# Patient Record
(demographics unavailable — no encounter records)

---

## 2024-11-20 NOTE — ASSESSMENT
[FreeTextEntry1] : Bacterial PNA - sxms improving, no fever, cp, wheezing  - seen on CT - c/w abx - RTC 1 mo eval and repeat CT scan   Left lumbar pain w/ sciatica - cyclobenzaprine, ibuprofen prn - lumbar xray - PMR referral

## 2024-11-20 NOTE — HISTORY OF PRESENT ILLNESS
[FreeTextEntry8] : 36F present post ED visit with bacterial pna and lumbar pain. Pt reports going to [facility] ED for fever and sob. In ED CTA r/o PE found evidence of PNA dc on CAP abx. During this time pt reports left lumbar pain with sciatica. Experienced sxms in past that resolved without injections or surgical interventions. currently no fever, cp, sob, wheezing, urinary incontinence, changes in gait. Ketoconazole Counseling:   Patient counseled regarding improving absorption with orange juice.  Adverse effects include but are not limited to breast enlargement, headache, diarrhea, nausea, upset stomach, liver function test abnormalities, taste disturbance, and stomach pain.  There is a rare possibility of liver failure that can occur when taking ketoconazole. The patient understands that monitoring of LFTs may be required, especially at baseline. The patient verbalized understanding of the proper use and possible adverse effects of ketoconazole.  All of the patient's questions and concerns were addressed.
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
## 2024-11-26 NOTE — PHYSICAL EXAM
[de-identified] : Constitutional: Well-nourished, well-developed, No acute distress  LUMBAR SPINE Inspection reveals no scoliosis Range of motion testing demonstrates relief with forward flexion, full AROM  Facet loading maneuver positive on the Left  + positive tenderness to palpation of lumbar paraspinal muscles and midline L4-L5 Seated slump test : negative Straight leg raise : positive  HIPS/PELVIS - Symmetric in standing and lying Hip maneuvers: GORGE positive bilaterally for back pain  Sacroiliac maneuvers: no TTP of bilateral PSIS Non tender buttocks, greater trochanters  NEURO - Normal bulk and tone LE strength 5/5 including hip flexion, knee flexion, knee extension, ankle dorsiflexion, ankle plantarflexion, eversion, and EHL Sensation - intact to light touch in bilateral lower extremities. LE Reflexes 2+ patellar and Achilles reflexes bilaterally no Clonus GAIT - Normal base, normal stride length, non-antalgic  [de-identified] : XR of lumbar spine Date: 11/19/2024   Views: 2 views Performed at [facility]: Yes Impression:   Levoscoliosis of the thoracolumbar spine. Straightening of the lumbar lordosis. No spondylolisthesis. Vertebral body heights and disc heights are preserved. Lower lumbar facet arthrosis.  These images were personally reviewed with original findings documented as above.
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
## 2024-11-26 NOTE — PHYSICAL EXAM
[de-identified] : Constitutional: Well-nourished, well-developed, No acute distress  LUMBAR SPINE Inspection reveals no scoliosis Range of motion testing demonstrates relief with forward flexion, full AROM  Facet loading maneuver positive on the Left  + positive tenderness to palpation of lumbar paraspinal muscles and midline L4-L5 Seated slump test : negative Straight leg raise : positive  HIPS/PELVIS - Symmetric in standing and lying Hip maneuvers: GORGE positive bilaterally for back pain  Sacroiliac maneuvers: no TTP of bilateral PSIS Non tender buttocks, greater trochanters  NEURO - Normal bulk and tone LE strength 5/5 including hip flexion, knee flexion, knee extension, ankle dorsiflexion, ankle plantarflexion, eversion, and EHL Sensation - intact to light touch in bilateral lower extremities. LE Reflexes 2+ patellar and Achilles reflexes bilaterally no Clonus GAIT - Normal base, normal stride length, non-antalgic  [de-identified] : XR of lumbar spine Date: 11/19/2024   Views: 2 views Performed at [facility]: Yes Impression:   Levoscoliosis of the thoracolumbar spine. Straightening of the lumbar lordosis. No spondylolisthesis. Vertebral body heights and disc heights are preserved. Lower lumbar facet arthrosis.  These images were personally reviewed with original findings documented as above.
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
## 2024-12-06 NOTE — ASSESSMENT
[FreeTextEntry1] : Patient is a 37 y/o F who came here for follow-up on her pneumonia.  1) CAP - seen at [facility] ED 11/18/2024 - CT Chest showed:  Patchy groundglass and consolidative opacities along with tree-in-bud  opacities noted in the right lower lobe suggesting infectious versus  inflammatory airspace and small airways disease. Attention on follow-up  after treatment is recommended to assure resolution.  Bronchial wall thickening, most notable in the right lower lobe,  suggesting infectious versus inflammatory bronchitis.  - completed course of antibiotics (Amoxicillin and Azithromycin) - still complains of cough, pleuritic chest pain and shortness of breath - benzonatate just made her cough worse - advised that she does not need additional antibiotics and her symptoms would take time to improve - can continue mucinex as needed - she is scheduled for a repeat CT Chest (12/17/2024)   2) Chest Pain - complains of intermittent mid-chest pain, shortness of breath, dyspnea on exertion - EKG done showed NSR, no ischemic changes - reassured that it is just pleuritic and not anginal in origin - can take NSAIDs as needed  3) Shortness of Breath - reports dyspnea on exertion - trial of albuterol inhaler - would refer to Pulmo if no improvement for PFTs  Total time spent caring for the patient today was 30 minutes. This includes time spent before the visit reviewing the chart, time spent during visit, and time spent after the visit on documentation, etc.

## 2024-12-06 NOTE — PHYSICAL EXAM
[No Acute Distress] : no acute distress [Well Nourished] : well nourished [Well Developed] : well developed [Well-Appearing] : well-appearing [Normal Sclera/Conjunctiva] : normal sclera/conjunctiva [PERRL] : pupils equal round and reactive to light [EOMI] : extraocular movements intact [Normal Outer Ear/Nose] : the outer ears and nose were normal in appearance [Normal Oropharynx] : the oropharynx was normal [No JVD] : no jugular venous distention [No Lymphadenopathy] : no lymphadenopathy [Supple] : supple [Thyroid Normal, No Nodules] : the thyroid was normal and there were no nodules present [No Respiratory Distress] : no respiratory distress  [No Accessory Muscle Use] : no accessory muscle use [Clear to Auscultation] : lungs were clear to auscultation bilaterally [Normal Rate] : normal rate  [Regular Rhythm] : with a regular rhythm [Normal S1, S2] : normal S1 and S2 [No Murmur] : no murmur heard [No Carotid Bruits] : no carotid bruits [No Abdominal Bruit] : a ~M bruit was not heard ~T in the abdomen [No Varicosities] : no varicosities [Pedal Pulses Present] : the pedal pulses are present [No Edema] : there was no peripheral edema [No Palpable Aorta] : no palpable aorta [No Extremity Clubbing/Cyanosis] : no extremity clubbing/cyanosis [Soft] : abdomen soft [Non Tender] : non-tender [Non-distended] : non-distended [No Masses] : no abdominal mass palpated [No HSM] : no HSM [Normal Bowel Sounds] : normal bowel sounds [Normal Posterior Cervical Nodes] : no posterior cervical lymphadenopathy [Normal Anterior Cervical Nodes] : no anterior cervical lymphadenopathy [No CVA Tenderness] : no CVA  tenderness [No Spinal Tenderness] : no spinal tenderness [No Joint Swelling] : no joint swelling [Grossly Normal Strength/Tone] : grossly normal strength/tone [No Rash] : no rash [Coordination Grossly Intact] : coordination grossly intact [No Focal Deficits] : no focal deficits [Normal Gait] : normal gait [Deep Tendon Reflexes (DTR)] : deep tendon reflexes were 2+ and symmetric [Normal Affect] : the affect was normal [Normal Insight/Judgement] : insight and judgment were intact [de-identified] : (+) exudate on L pharyngeal wall

## 2024-12-06 NOTE — HISTORY OF PRESENT ILLNESS
[FreeTextEntry1] : follow-up [de-identified] : Patient is a 37 y/o F who came here for follow-up on her pneumonia. She states that her cough is improving but her breathing is still short. She also reports dyspnea on exertion. She denies any headache, dizziness, nausea, vomiting, cough, fever, chest pain, shortness of breath, palpitation, heartburn, abdominal pain, diarrhea, constipation, dysuria, hematuria, back pain, joint pain, weight loss, loss of appetite.

## 2024-12-09 NOTE — ASSESSMENT
[FreeTextEntry1] : Patient is a 37 y/o F who came here for follow-up on her pneumonia.  1) Pneumonia - completed course of ABx - still symptomatic w/ cough, shortness of breath - continue Amoxicillin for 7 more days  2) Cough - Centor Criteria - 1 (exudates) - send for throat culture, Strep test - continue Amoxicillin for 7 more days  2) Shortness of Breath - work MD concern for asthma - pt. states she has shortness of breath, dyspnea on exertion - symptoms not getting better w/ current  ABx - agreed to have pt referred to Pulmo for PFTs - continue albuterol prn - start symbicort BID - trial of prednisone 20 mg x 5 days  3) Acute Otitis Externa, AS - prescribed Neomycin-Polymyxin otic drops  4) Depression - PHQ-9 - positive - pt attributes to current illness - offered SW consult (refused)  Total time spent caring for the patient today was 30 minutes. This includes time spent before the visit reviewing the chart, time spent during visit, and time spent after the visit on documentation, etc.

## 2024-12-09 NOTE — HISTORY OF PRESENT ILLNESS
[FreeTextEntry1] : follow-up [de-identified] : Patient is a 35 y/o F who came here for follow-up on her pneumonia. She still has cough which is not improving. She also reports dyspnea on exertion. Albuterol did not afford any relief. She also reports L ear pain. She denies any headache, dizziness, nausea, vomiting, cough, fever, chest pain, palpitation, heartburn, abdominal pain, diarrhea, constipation, dysuria, hematuria, back pain, joint pain, weight loss, loss of appetite.

## 2024-12-09 NOTE — PHYSICAL EXAM
[No Acute Distress] : no acute distress [Well Nourished] : well nourished [Well Developed] : well developed [Well-Appearing] : well-appearing [Normal Sclera/Conjunctiva] : normal sclera/conjunctiva [PERRL] : pupils equal round and reactive to light [EOMI] : extraocular movements intact [Normal Oropharynx] : the oropharynx was normal [No JVD] : no jugular venous distention [No Lymphadenopathy] : no lymphadenopathy [Supple] : supple [Thyroid Normal, No Nodules] : the thyroid was normal and there were no nodules present [No Respiratory Distress] : no respiratory distress  [No Accessory Muscle Use] : no accessory muscle use [Clear to Auscultation] : lungs were clear to auscultation bilaterally [Normal Rate] : normal rate  [Regular Rhythm] : with a regular rhythm [Normal S1, S2] : normal S1 and S2 [No Murmur] : no murmur heard [No Carotid Bruits] : no carotid bruits [No Abdominal Bruit] : a ~M bruit was not heard ~T in the abdomen [No Varicosities] : no varicosities [Pedal Pulses Present] : the pedal pulses are present [No Edema] : there was no peripheral edema [No Palpable Aorta] : no palpable aorta [No Extremity Clubbing/Cyanosis] : no extremity clubbing/cyanosis [Soft] : abdomen soft [Non Tender] : non-tender [Non-distended] : non-distended [No Masses] : no abdominal mass palpated [No HSM] : no HSM [Normal Bowel Sounds] : normal bowel sounds [Normal Posterior Cervical Nodes] : no posterior cervical lymphadenopathy [Normal Anterior Cervical Nodes] : no anterior cervical lymphadenopathy [No CVA Tenderness] : no CVA  tenderness [No Spinal Tenderness] : no spinal tenderness [No Joint Swelling] : no joint swelling [Grossly Normal Strength/Tone] : grossly normal strength/tone [No Rash] : no rash [Coordination Grossly Intact] : coordination grossly intact [No Focal Deficits] : no focal deficits [Normal Gait] : normal gait [Deep Tendon Reflexes (DTR)] : deep tendon reflexes were 2+ and symmetric [Normal Affect] : the affect was normal [Normal Insight/Judgement] : insight and judgment were intact [de-identified] : (+) exudates on the throat, (+) erythema on the L ear canal

## 2024-12-10 NOTE — REVIEW OF SYSTEMS
[Negative] : Psychiatric [Sore Throat] : sore throat [Cough] : cough [Chest Tightness] : chest tightness [Sputum] : sputum [SOB on Exertion] : sob on exertion [TextBox_144] : pre-DM

## 2024-12-10 NOTE — PHYSICAL EXAM
[No Acute Distress] : no acute distress [Normal Appearance] : normal appearance [No Neck Mass] : no neck mass [Normal Rate/Rhythm] : normal rate/rhythm [Normal S1, S2] : normal s1, s2 [No Murmurs] : no murmurs [No Resp Distress] : no resp distress [Clear to Auscultation Bilaterally] : clear to auscultation bilaterally [No Abnormalities] : no abnormalities [Benign] : benign [Normal Gait] : normal gait [No Clubbing] : no clubbing [No Cyanosis] : no cyanosis [No Edema] : no edema [FROM] : FROM [Normal Color/ Pigmentation] : normal color/ pigmentation [No Focal Deficits] : no focal deficits [Oriented x3] : oriented x3 [Normal Affect] : normal affect [I] : Mallampati Class: I [TextBox_11] : white spots noted on b/l tonsils

## 2024-12-10 NOTE — PROCEDURE
[FreeTextEntry1] : 11/18/2024  CT ANGIO CHEST PULM ART WAWIC  FINDINGS: LUNGS AND LARGE AIRWAYS:Patent central airways. Patchy groundglass and  consolidative opacities along with tree-in-bud opacities noted in the  right lower lobe suggesting infectious versus inflammatory airspace and  small airways disease. Bronchial wall thickening, most notable in the  right lower lobe, suggesting infectious versus inflammatory bronchitis. PLEURA: No pleural effusion. VESSELS: Main pulmonary artery measures 3.4 cm diameter suggesting  pulmonary hypertension. No evidence of pulmonary embolism. HEART: Heart size is normal. No pericardial effusion. MEDIASTINUM AND VIVIAN: Right hilar lymphadenopathy, measuring up to 1.6 cm  in short axis, likely reactive. Mildly enlarged reactive mediastinal  lymph nodes are also noted. CHEST WALL AND LOWER NECK: Within normal limits. VISUALIZED UPPER ABDOMEN: Hepatic steatosis. BONES: Within normal limits. IMPRESSION: No evidence of PE. Patchy groundglass and consolidative opacities along with tree-in-bud  opacities noted in the right lower lobe suggesting infectious versus  inflammatory airspace and small airways disease. Attention on follow-up  after treatment is recommended to assure resolution. Bronchial wall thickening, most notable in the right lower lobe,  suggesting infectious versus inflammatory bronchitis.

## 2024-12-10 NOTE — HISTORY OF PRESENT ILLNESS
[Current] : current [Never] : never [TextBox_4] : Ms. ANAYA is a 36 year woman, never smoker (but active vaping use), with PMH spinal surgery for herniated disc, who presents to Pulmonary clinic for cough/dyspnea. Referred by Dr. Palumbo. 12/10/2024 11/18 seen in [facility] ED for pneumonia, d/c'd on amox and azithro, completed course end of Nov. 2 wks prior to pna had COVID-19 infx. Now with persistent cough. Notes white discharge in back of throat, strep/cx pending, was rx amox by PCP. Having a conversation finds herself gasping for air, noticeable chest tightness. If she walks more than3 blocks will trigger coughing. After walking up 1 flight of stairs at the top will feel winded. Is allergic to dogs and has a husky but no worsening of sx around dog. Vapes: starting 2023, daily use, stopped 11/14 (Roney round 2 blue razz). 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  Boyfriend with possible walking pna. At this time, she denies anginal/pleuritic CP, wheezing, stridor, orthopnea, hemoptysis, LE edema, recent travel, history of frequent LRTIs (commonly gets strep throat though), active f/c/n/v.

## 2024-12-10 NOTE — ASSESSMENT
[FreeTextEntry1] : Ms. ANAYA is a 36 year woman, never smoker (but active vaping use), with PMH spinal surgery for herniated disc, who presents to Pulmonary clinic for cough. Referred by Dr. Palumbo. Suspect post-pna persistent sx, active strep throat (pt gets infx chronically), possible post-pna persistent cough. Suspect viral vs bacterial pna but cannot r/o lipoid pneumonia or pneumonitis/bronchitis related to vaping.  Plan: - For suspected component of inflammatory process related to vaping: prednisone taper: 40mg x 5d, then taper by 10mg q2d - C/w albuterol q4-6h PRN SOB/wheezing; can also trial symbicort but unclear benefit of ICS/LABA - Trial of mucinex-ER 600mg BID as needed to promote mucociliary clearance - CT chest repeat 6 wks after last CT chest (11/18) ~12/30 - Amox as per PCP for strep throat - Advised at length to abstain from vaping/smoking - Return to clinic in 3-4 weeks to re-evaluate respiratory sx, CT chest, ~ First week of Jan. Pt knows to call for any interval pulmonary issues or concerns

## 2025-01-24 NOTE — HISTORY OF PRESENT ILLNESS
[Current] : current [Never] : never [TextBox_4] : Ms. ANAYA is a 36 year woman, never smoker (but active vaping use), with PMH spinal surgery for herniated disc, who presents to Pulmonary clinic for cough/dyspnea. Referred by Dr. Palumbo. 12/10/2024 11/18 seen in [facility] ED for pneumonia, d/c'd on amox and azithro, completed course end of Nov. 2 wks prior to pna had COVID-19 infx. Now with persistent cough. Notes white discharge in back of throat, strep/cx pending, was rx amox by PCP. Having a conversation finds herself gasping for air, noticeable chest tightness. If she walks more than3 blocks will trigger coughing. After walking up 1 flight of stairs at the top will feel winded. Is allergic to dogs and has a husky but no worsening of sx around dog. Vapes: starting 2023, daily use, stopped 11/14 (Roney round 2 blue razz). 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   01/24/2025 Since last visit, coughing persists, feels mucus coming out. Still feels like there's "restriction" in expanding the lungs more, mostly with exertion. Notices it more when walking her dog or going up stairs. Has to go up 3 flights of stairs to go to her work locker, which is the situation she experiences dyspnea the most, sometimes accompanied by coughing. Used symbicort several times but stopped because she coughed more with use. Is using albuterol once daily with relief of sx. Completed steroid course and felt it helped sx overall.  At this time, she denies anginal/pleuritic CP, SOB at rest, wheezing, stridor, orthopnea, hemoptysis, LE edema, recent travel, history of frequent LRTIs (commonly gets strep throat though), active f/c/n/v.

## 2025-01-24 NOTE — ASSESSMENT
[FreeTextEntry1] : Ms. ANAYA is a 36 year woman, never smoker (but active vaping use), with [facility] spinal surgery for herniated disc, who presents to Pulmonary clinic for cough. Referred by Dr. Palumbo. Was f/w RLL pneumonia. Suspect post-pna persistent sx, active strep throat (pt gets infx chronically), possible post-pna persistent cough. Suspected viral vs bacterial pna but cannot r/o lipoid pneumonia or pneumonitis/bronchitis related to vaping. Pt now improved s/p course of abx for strep throat and steroids for pneumonitis but with persistent chest tightness/dyspnea/cough with exertion which albuterol use relieves. At this time cannot r/o asthma spectrum of disease.   Plan: - Start trial of symbicort 80/4.5 2 puffs BID rinsing after use - C/w albuterol q4-6h PRN SOB/wheezing - Rx also provided for spacer to use with inhaler to ensure adequate inhaled medication delivery - CT chest 12/2024 reviewed in full with pt: nearly completely resolved RLL pna, some areas of linear atelectasis - Advised to abstain from vaping/smoking - Obtain full PFTs prior to next appt/at next visit, including lung volume, spirometry, bronchodilator responsiveness, DLCO - Return to clinic in 1-2 mos to re-evaluate respiratory sx. Pt knows to call for any interval pulmonary issues or concerns

## 2025-01-24 NOTE — REVIEW OF SYSTEMS
[Sore Throat] : sore throat [Cough] : cough [Chest Tightness] : chest tightness [Sputum] : sputum [SOB on Exertion] : sob on exertion [Negative] : Psychiatric [TextBox_144] : pre-DM

## 2025-01-24 NOTE — PROCEDURE
[FreeTextEntry1] : 12/30/2024 [facility] CT CHEST FINDINGS: AIRWAYS AND LUNGS: The central tracheobronchial tree is patent.  Near-complete resolution prior right lung opacity. MEDIASTINUM AND PLEURA: There are no enlarged mediastinal, hilar or axillary lymph nodes. The visualized portion of the thyroid gland is unremarkable. There is no pleural effusion. There is no pneumothorax. HEART AND VESSELS: The heart is normal in size.  There are no atherosclerotic calcifications of the aorta.  There is no pericardial effusion. UPPER ABDOMEN: Images of the upper abdomen demonstrate no abnormality. BONES AND SOFT TISSUES: The bones are unremarkable.  The soft tissues are unremarkable. IMPRESSION: Near-complete resolution prior right lung opacity.   11/18/2024  CT ANGIO CHEST PULM ART WAWIC  FINDINGS: LUNGS AND LARGE AIRWAYS:Patent central airways. Patchy groundglass and  consolidative opacities along with tree-in-bud opacities noted in the  right lower lobe suggesting infectious versus inflammatory airspace and  small airways disease. Bronchial wall thickening, most notable in the  right lower lobe, suggesting infectious versus inflammatory bronchitis. PLEURA: No pleural effusion. VESSELS: Main pulmonary artery measures 3.4 cm diameter suggesting  pulmonary hypertension. No evidence of pulmonary embolism. HEART: Heart size is normal. No pericardial effusion. MEDIASTINUM AND VIVIAN: Right hilar lymphadenopathy, measuring up to 1.6 cm  in short axis, likely reactive. Mildly enlarged reactive mediastinal  lymph nodes are also noted. CHEST WALL AND LOWER NECK: Within normal limits. VISUALIZED UPPER ABDOMEN: Hepatic steatosis. BONES: Within normal limits. IMPRESSION: No evidence of PE. Patchy groundglass and consolidative opacities along with tree-in-bud  opacities noted in the right lower lobe suggesting infectious versus  inflammatory airspace and small airways disease. Attention on follow-up  after treatment is recommended to assure resolution. Bronchial wall thickening, most notable in the right lower lobe,  suggesting infectious versus inflammatory bronchitis.

## 2025-01-24 NOTE — PHYSICAL EXAM
[No Acute Distress] : no acute distress [I] : Mallampati Class: I [Normal Appearance] : normal appearance [No Neck Mass] : no neck mass [No Resp Distress] : no resp distress [No Abnormalities] : no abnormalities [Normal Gait] : normal gait [No Clubbing] : no clubbing [No Cyanosis] : no cyanosis [No Edema] : no edema [FROM] : FROM [Normal Color/ Pigmentation] : normal color/ pigmentation [No Focal Deficits] : no focal deficits [Oriented x3] : oriented x3 [Normal Affect] : normal affect [No Acc Muscle Use] : no acc muscle use [TextBox_54] : pt reports normal HR, no palpitations

## 2025-01-24 NOTE — REASON FOR VISIT
[Pneumonia] : pneumonia [Shortness of Breath] : shortness of breath [Home] : at home, [unfilled] , at the time of the visit. [Other Location: e.g. Home (Enter Location, City,State)___] : at [unfilled] [Patient] : the patient [Self] : self [Follow-Up] : a follow-up visit

## 2025-03-11 NOTE — PROCEDURE
[FreeTextEntry1] : 12/30/2024 [facility] CT CHEST FINDINGS: AIRWAYS AND LUNGS: The central tracheobronchial tree is patent.  Near-complete resolution prior right lung opacity. MEDIASTINUM AND PLEURA: There are no enlarged mediastinal, hilar or axillary lymph nodes. The visualized portion of the thyroid gland is unremarkable. There is no pleural effusion. There is no pneumothorax. HEART AND VESSELS: The heart is normal in size.  There are no atherosclerotic calcifications of the aorta.  There is no pericardial effusion. UPPER ABDOMEN: Images of the upper abdomen demonstrate no abnormality. BONES AND SOFT TISSUES: The bones are unremarkable.  The soft tissues are unremarkable. IMPRESSION: Near-complete resolution prior right lung opacity.   11/18/2024  CT ANGIO CHEST PULM ART WAWIC  FINDINGS: LUNGS AND LARGE AIRWAYS:Patent central airways. Patchy groundglass and  consolidative opacities along with tree-in-bud opacities noted in the  right lower lobe suggesting infectious versus inflammatory airspace and  small airways disease. Bronchial wall thickening, most notable in the  right lower lobe, suggesting infectious versus inflammatory bronchitis. PLEURA: No pleural effusion. VESSELS: Main pulmonary artery measures 3.4 cm diameter suggesting  pulmonary hypertension. No evidence of pulmonary embolism. HEART: Heart size is normal. No pericardial effusion. MEDIASTINUM AND VIVIAN: Right hilar lymphadenopathy, measuring up to 1.6 cm  in short axis, likely reactive. Mildly enlarged reactive mediastinal  lymph nodes are also noted. CHEST WALL AND LOWER NECK: Within normal limits. VISUALIZED UPPER ABDOMEN: Hepatic steatosis. BONES: Within normal limits. IMPRESSION: No evidence of PE. Patchy groundglass and consolidative opacities along with tree-in-bud  opacities noted in the right lower lobe suggesting infectious versus  inflammatory airspace and small airways disease. Attention on follow-up  after treatment is recommended to assure resolution. Bronchial wall thickening, most notable in the right lower lobe,  suggesting infectious versus inflammatory bronchitis.

## 2025-03-11 NOTE — PHYSICAL EXAM
[No Acute Distress] : no acute distress [I] : Mallampati Class: I [Normal Appearance] : normal appearance [No Neck Mass] : no neck mass [No Resp Distress] : no resp distress [No Acc Muscle Use] : no acc muscle use [No Abnormalities] : no abnormalities [Normal Gait] : normal gait [No Clubbing] : no clubbing [No Cyanosis] : no cyanosis [No Edema] : no edema [FROM] : FROM [Normal Color/ Pigmentation] : normal color/ pigmentation [No Focal Deficits] : no focal deficits [Oriented x3] : oriented x3 [Normal Affect] : normal affect [TextBox_54] : pt reports normal HR, no palpitations

## 2025-03-11 NOTE — HISTORY OF PRESENT ILLNESS
[Current] : current [Never] : never [TextBox_4] : Ms. ANAYA is a 36 year woman, never smoker (but active vaping use), with [facility] [place] spinal surgery for herniated disc, who presents to Pulmonary clinic for cough/dyspnea. Referred by Dr. Palumbo. 12/10/2024 11/18 seen in [facility] ED for pneumonia, d/c'd on amox and azithro, completed course end of Nov. 2 wks prior to pna had COVID-19 infx. Now with persistent cough. Notes white discharge in back of throat, strep/cx pending, was rx amox by PCP. Having a conversation finds herself gasping for air, noticeable chest tightness. If she walks more than3 blocks will trigger coughing. After walking up 1 flight of stairs at the top will feel winded. Is allergic to dogs and has a husky but no worsening of sx around dog. Vapes: starting 2023, daily use, stopped 11/14 (Roney round 2 blue Isofluxz). 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  Boyfriend with possible walking pna.   01/24/2025 Since last visit, coughing persists, feels mucus coming out. Still feels like there's "restriction" in expanding the lungs more, mostly with exertion. Notices it more when walking her dog or going up stairs. Has to go up 3 flights of stairs to go to her work locker, which is the situation she experiences dyspnea the most, sometimes accompanied by coughing. Used symbicort several times but stopped because she coughed more with use. Is using albuterol once daily with relief of sx. Completed steroid course and felt it helped sx overall.    03/11/2025 Since last visit, feels that her breathing is improved since using symbicort 2 puffs BID. ET is improved but still with climbing stairs or running has chest tightness and dyspnea. On level ground she has no difficulty but running after her dog she feels SOB. She will then do deep breathing exercises and then when she goes home she uses albuterol and sx are relieved. AFter eating and taking a walk will get SOB but also feel a burning sensation in the middle of her chest. At this time, she denies anginal/pleuritic CP, SOB at rest, wheezing, stridor, orthopnea, hemoptysis, LE edema, recent travel, history of frequent LRTIs (commonly gets strep throat though), active f/c/n/v.

## 2025-03-11 NOTE — ASSESSMENT
[FreeTextEntry1] : Ms. ANAYA is a 36 year woman, never smoker (but active vaping use), with [facility] spinal surgery for herniated disc, who presents to Pulmonary clinic for cough. Referred by Dr. Palumbo. Was f/w RLL pneumonia. Suspect post-pna persistent sx, active strep throat (pt gets infx chronically), possible post-pna persistent cough. Suspected viral vs bacterial pna but cannot r/o lipoid pneumonia or pneumonitis/bronchitis related to vaping. Pt now improved s/p course of abx for strep throat and steroids for pneumonitis but with persistent chest tightness/dyspnea/cough with exertion which albuterol use relieves. At this time cannot r/o asthma spectrum of disease.   Plan: - Start trial of symbicort 160/4.5 2 puffs BID rinsing after use - C/w albuterol q4-6h PRN SOB/wheezing - Pt has spacer to use with inhaler to ensure adequate inhaled medication delivery - CT chest 12/2024 reviewed in full with pt: nearly completely resolved RLL pna, some areas of linear atelectasis - Advised to abstain from vaping/smoking, has quit  - Obtain eos, asthma profile for phenotyping and evaluate for environmental triggers for asthma/allergy sx - Obtain full PFTs prior to next appt/at next visit, including lung volume, spirometry, bronchodilator responsiveness, DLCO - Return to clinic in 1-2 mos to re-evaluate respiratory sx. Pt knows to call for any interval pulmonary issues or concerns